# Patient Record
(demographics unavailable — no encounter records)

---

## 2024-11-26 NOTE — HISTORY OF PRESENT ILLNESS
[de-identified] : Carolina Whyte is a 75-year-old female who presents to the office for evaluation of her left knee pain.  Patient has been experiencing left knee pain for about 1 year.  Pain is located throughout the knee.  Pain is worse with walking.  She has tried Tylenol.  She has not tried physical therapy or injections.  No falls.  No fevers or chills.  History: Diabetes, HTN, HLD

## 2024-11-26 NOTE — PHYSICAL EXAM
[de-identified] : Constitutional:  75 year old female, alert and oriented, cooperative, in no acute distress.  HEENT  NC/AT.  Appearance: symmetric  Neck/Back Straight without deformity or instability.  Good ROM.  Chest/Respiratory  Respiratory effort: no intercostal retractions or use of accessory muscles. Nonlabored Breathing  Skin  On inspection, warm and dry without rashes or lesions.  Mental Status:  Judgment, insight: intact Orientation: oriented to time, place, and person  Neurological: Sensory and Motor are grossly intact throughout  Left Knee  Inspection:     Skin intact, no rashes or lesions     No Effusion     Non-tender to palpation over tibial tubercle, patella, medial and lateral joint line, and pes insertion.  Range of Motion: 	Extension - -10 degrees 	Flexion - 90 degrees 	Extensor lag: None  Stability:      Demonstrates no Varus or Valgus instability      Negative Anterior or Posterior drawer.      Negative Lachman's  Patella: stable, tracks well.   Neurologic Exam     Motor intact including 5/5 Extensor Hallucis Longus, 5/5 Flexor Hallucis Longus, 5/5 Tibialis Anterior and 5/5 Gastrocnemius     Sensation Intact to Light Touch including Saphenous, Sural, Superficial Peroneal, Deep Peroneal, Tibial nerve distributions  Vascular Exam     Foot is warm and well perfused with 2+ Dorsalis Pedis Pulse   No pain with range of motion of the bilateral hips or right knee. No lumbar paraspinal muscle tenderness. [de-identified] : XRay:  XRays of the Pelvis (1 View) taken in the office today and discussed with the patient. XRays demonstrate no obvious fracture or dislocation. There is no significant evidence of osteoarthritis or osteophyte formation. (my personal interpretation).  XRay: XRays of the Left Knee (4 Views) taken in the office today and reviewed with the patient. XRays demonstrate tricompartmental joint space narrowing, with bone on bone articulations in the medial compartment, with subchondral sclerosis, overlying osteophytes, all consistent with severe osteoarthritis, KL rdGrdrrdarddrderd:rd rd3rd. There is varus alignment. (my personal interpretation)

## 2024-11-26 NOTE — DISCUSSION/SUMMARY
[de-identified] : Carolina Whyte is a 75-year-old female who presents to the office for evaluation of her left knee pain.  X-rays showed severe left knee osteoarthritis.  Examination showed decreased left knee range of motion. Discussed with the patient the examination and imaging findings. Discussed with the patient the operative and nonoperative management of knee osteoarthritis, including total knee arthroplasty. Patient would like to continue with nonoperative management at this time. Discussed with the patient the nonoperative management of patient's knee osteoarthritis at this time, including physical therapy, anti-inflammatories, and injections. Patient was given a referral for physical therapy. Patient was given a prescription for Meloxicam 15mg daily for 30 days.  Patient will follow-up in 6 weeks for reevaluation and management.  Patient understanding and in agreement the plan.  All questions answered   Plan: -Physical Therapy  -Meloxicam 15mg daily for 30 days -Follow up in 6 weeks for reevaluation and management

## 2024-11-26 NOTE — HISTORY OF PRESENT ILLNESS
[de-identified] : Carolina Whyte is a 75-year-old female who presents to the office for evaluation of her left knee pain.  Patient has been experiencing left knee pain for about 1 year.  Pain is located throughout the knee.  Pain is worse with walking.  She has tried Tylenol.  She has not tried physical therapy or injections.  No falls.  No fevers or chills.  History: Diabetes, HTN, HLD

## 2024-11-26 NOTE — DISCUSSION/SUMMARY
[de-identified] : Carolina Whyte is a 75-year-old female who presents to the office for evaluation of her left knee pain.  X-rays showed severe left knee osteoarthritis.  Examination showed decreased left knee range of motion. Discussed with the patient the examination and imaging findings. Discussed with the patient the operative and nonoperative management of knee osteoarthritis, including total knee arthroplasty. Patient would like to continue with nonoperative management at this time. Discussed with the patient the nonoperative management of patient's knee osteoarthritis at this time, including physical therapy, anti-inflammatories, and injections. Patient was given a referral for physical therapy. Patient was given a prescription for Meloxicam 15mg daily for 30 days.  Patient will follow-up in 6 weeks for reevaluation and management.  Patient understanding and in agreement the plan.  All questions answered   Plan: -Physical Therapy  -Meloxicam 15mg daily for 30 days -Follow up in 6 weeks for reevaluation and management

## 2024-11-26 NOTE — PHYSICAL EXAM
[de-identified] : Constitutional:  75 year old female, alert and oriented, cooperative, in no acute distress.  HEENT  NC/AT.  Appearance: symmetric  Neck/Back Straight without deformity or instability.  Good ROM.  Chest/Respiratory  Respiratory effort: no intercostal retractions or use of accessory muscles. Nonlabored Breathing  Skin  On inspection, warm and dry without rashes or lesions.  Mental Status:  Judgment, insight: intact Orientation: oriented to time, place, and person  Neurological: Sensory and Motor are grossly intact throughout  Left Knee  Inspection:     Skin intact, no rashes or lesions     No Effusion     Non-tender to palpation over tibial tubercle, patella, medial and lateral joint line, and pes insertion.  Range of Motion: 	Extension - -10 degrees 	Flexion - 90 degrees 	Extensor lag: None  Stability:      Demonstrates no Varus or Valgus instability      Negative Anterior or Posterior drawer.      Negative Lachman's  Patella: stable, tracks well.   Neurologic Exam     Motor intact including 5/5 Extensor Hallucis Longus, 5/5 Flexor Hallucis Longus, 5/5 Tibialis Anterior and 5/5 Gastrocnemius     Sensation Intact to Light Touch including Saphenous, Sural, Superficial Peroneal, Deep Peroneal, Tibial nerve distributions  Vascular Exam     Foot is warm and well perfused with 2+ Dorsalis Pedis Pulse   No pain with range of motion of the bilateral hips or right knee. No lumbar paraspinal muscle tenderness. [de-identified] : XRay:  XRays of the Pelvis (1 View) taken in the office today and discussed with the patient. XRays demonstrate no obvious fracture or dislocation. There is no significant evidence of osteoarthritis or osteophyte formation. (my personal interpretation).  XRay: XRays of the Left Knee (4 Views) taken in the office today and reviewed with the patient. XRays demonstrate tricompartmental joint space narrowing, with bone on bone articulations in the medial compartment, with subchondral sclerosis, overlying osteophytes, all consistent with severe osteoarthritis, KL thGthrthathdtheth:th th5th. There is varus alignment. (my personal interpretation)

## 2024-12-17 NOTE — PHYSICAL EXAM
[Normal] : supple, no neck mass and thyroid not enlarged [Normal Neck Lymph Nodes] : normal neck lymph nodes  [Normal Supraclavicular Lymph Nodes] : normal supraclavicular lymph nodes [Normal Groin Lymph Nodes] : normal groin lymph nodes [Normal Axillary Lymph Nodes] : normal axillary lymph nodes [Normal] : oriented to person, place and time, with appropriate affect [de-identified] : trochar sites well healed

## 2024-12-17 NOTE — CONSULT LETTER
[Dear  ___] : Dear  [unfilled], [Consult Letter:] : I had the pleasure of evaluating your patient, [unfilled]. [Please see my note below.] : Please see my note below. [Consult Closing:] : Thank you very much for allowing me to participate in the care of this patient.  If you have any questions, please do not hesitate to contact me. [Sincerely,] : Sincerely, [DrBest  ___] : Dr. PAREDES [DrBest ___] : Dr. PAREDES [FreeTextEntry2] : Nicholas Lynch MD [FreeTextEntry3] : Matt Brown MD Surgical Oncology [facility] Office: [phone] Cell: [phone]

## 2024-12-17 NOTE — HISTORY OF PRESENT ILLNESS
[de-identified] : Ms. CAROLINA TRAN is a 75-year-old woman, referred by Dr. Nicholas Lynch for consultation regarding a pelvis mass, now s/p pelvic mass excision on 2024, here for a follow-up visit.   Carolina presented to [facility] in 2024 w/ complaints of dysarthria & paresthesia - c/f acute CVA (work up ended being negative). Incidentally found to have a pelvis mass that was not amenable to IR biopsy - advised to follow-up w/ GI.  CT A/P 2024 - in sigmoid mesentery & inseparable from the mesenteric side of the sigmoid colon is a mass measuring 3.7 x 2.9 cm - c/f neoplasm - there is an additional mass more posteriorly in the pelvis in a presacral location measuring 2.7 x 2.9 cm - c/f neoplasm - 5 mm nodule adjacent to the cecum  abd MRI 3/15/2024 ([facility]) - several scattered mesenteric LN, predominantly measuring sub centimeter in short axis - cannot evaluate pelvic masses seen on outside CT -> f/u w/ MRI pelvis or PET/CT   MR pelvis 5/10/2024 - in sigmoid and to a lesser extent, descending colon, there is diverticulosis w/o diverticulitis - in central pelvis, slightly left of midline, in sigmoid mesentery inseparable from wall of adjacent bowel & tethered to dome of bladder - there is an irregular spiculated masslike conglomerate, measuring 3.0 x 3.6 cm - in posterior pelvis, slightly left of midline, in presacral region, at level of S2-3, there is an irregular spiculated masslike conglomerate, measuring approx 2.6 x 2.6 cm - in perisigmoid & perirectal regions, there are several additional nodes, predominantly measuring sub centimeter in short axis  * these findings are indeterminate/suspicious, with differentials including but not limited to deep infiltrating endometriosis, carcinoid, desmoid, sclerosing mesenteritis, lymphoma and/or metastases -> clinical or lab follow-up, recommend PET/CT, biopsy and/or surgical consult  EGD/colonoscopy (Dr. Carlos Alberto Tabares) 2024 (prior to this her last scope was ~17 years prior) - normal esophagus w/o esophagitis - stomach antrum & body - positive for H. pylori - GE junction bx - squamocolumnar junction w/ chronic active carditis  - 1 cm hiatal hernia - erosive gastropathy w/ stigmata of recent bleeding, bx - normal duodenum  - 15 mm sessile polyp in the sigmoid colon, resected - tubulovillous adenoma *repeat scope in 1 year - normal mucosa in colon, bx - colonic mucosa w/ superficial hyperplastic changes  f/u H. pylori breath test 2024 - WNL (after receiving triple therapy)   PMH:  DM, gout, HLD. HTN, TIA (2016), vitamin D deficiency, H pylori (2024) PSH:  appendectomy >20 years ago, back surgery, B/L cataract surgery, JAMES-BSO >20 years ago (for heavy perimenopausal bleeding, no adjuvant HRT) Meds:  Janumet, ASA 81MG, amlodipine, atorvastatin, Olmesartan, Omeprazole ALL:  denies SH:  denies tobacco use, social ETOH, lives with her 2 daughters & grandchild, retired from a management job at [employer] FH: father w/ gastric cancer, sister w/ unknown primary GYN: Menarche 12. Menopause ~50's. . Age of first full-term pregnancy 22. Denies any OCP/HRT use ECOG 0  PET/CT 2024  - the 2 infiltrating masses in the pelvis are FDG avid, the anterior spiculated mass is adjacent to and inseparable from the sigmoid loop (2 x 2.9 cm, SUV 8.7), the posterior spiculated mass (2.7 x 2.7 cm, SUV 9.7) - small nodules adjacent to the masses are nonavid - more distally there is FDG uptake in the rectum & anal region w/o definite mass  - no enlarged of FDG avid LAD in the pelvis or abdomen  2024 - Carolina is here for an initial consultation, accompanied by her granddaughter, Alba. She reports that these findings are completely incidental, she is without any abdominal pain, bowel habits remain regular. Her appetite and weight are also well maintained. She is pending a Left knee total replacement but that is on hold given these current findings.  We discussed the need for excision of the pelvic masses.  We discussed the risks, benefits and alternatives of a robotic possible open approach, and a possible partial colectomy.  We also discussed post operative expectations and possible complications.  Carolina expresses understanding and agrees to proceed.  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 She is eating and voiding with no issues, ambulating well and pain is at a minimum.  Carolina is doing well; we discussed her final pathology which is negative for carcinoma but ultimately shows Rosai-Tomy disease. Discussing the findings with Pathology, they noted a significant number plasma cells and that IgG4 levels be checked.  She will be seeing Hematology as well.  Carolina will return in 3 months.  2024 - Carolina returns for ongoing follow-up, she is feeling well and back to her baseline. She notes some sensitivity to her incisions when wearing clothes but nothing serious that requires medication. She has not seen hematology yet.

## 2024-12-17 NOTE — HISTORY OF PRESENT ILLNESS
[de-identified] : Ms. CAROLINA TRAN is a 75-year-old woman, referred by Dr. Nicholas Lynch for consultation regarding a pelvis mass, now s/p pelvic mass excision on 2024, here for a follow-up visit.   Carolina presented to [facility] in 2024 w/ complaints of dysarthria & paresthesia - c/f acute CVA (work up ended being negative). Incidentally found to have a pelvis mass that was not amenable to IR biopsy - advised to follow-up w/ GI.  CT A/P 2024 - in sigmoid mesentery & inseparable from the mesenteric side of the sigmoid colon is a mass measuring 3.7 x 2.9 cm - c/f neoplasm - there is an additional mass more posteriorly in the pelvis in a presacral location measuring 2.7 x 2.9 cm - c/f neoplasm - 5 mm nodule adjacent to the cecum  abd MRI 3/15/2024 ([facility]) - several scattered mesenteric LN, predominantly measuring sub centimeter in short axis - cannot evaluate pelvic masses seen on outside CT -> f/u w/ MRI pelvis or PET/CT   MR pelvis 5/10/2024 - in sigmoid and to a lesser extent, descending colon, there is diverticulosis w/o diverticulitis - in central pelvis, slightly left of midline, in sigmoid mesentery inseparable from wall of adjacent bowel & tethered to dome of bladder - there is an irregular spiculated masslike conglomerate, measuring 3.0 x 3.6 cm - in posterior pelvis, slightly left of midline, in presacral region, at level of S2-3, there is an irregular spiculated masslike conglomerate, measuring approx 2.6 x 2.6 cm - in perisigmoid & perirectal regions, there are several additional nodes, predominantly measuring sub centimeter in short axis  * these findings are indeterminate/suspicious, with differentials including but not limited to deep infiltrating endometriosis, carcinoid, desmoid, sclerosing mesenteritis, lymphoma and/or metastases -> clinical or lab follow-up, recommend PET/CT, biopsy and/or surgical consult  EGD/colonoscopy (Dr. Carlos Alberto Tabares) 2024 (prior to this her last scope was ~17 years prior) - normal esophagus w/o esophagitis - stomach antrum & body - positive for H. pylori - GE junction bx - squamocolumnar junction w/ chronic active carditis  - 1 cm hiatal hernia - erosive gastropathy w/ stigmata of recent bleeding, bx - normal duodenum  - 15 mm sessile polyp in the sigmoid colon, resected - tubulovillous adenoma *repeat scope in 1 year - normal mucosa in colon, bx - colonic mucosa w/ superficial hyperplastic changes  f/u H. pylori breath test 2024 - WNL (after receiving triple therapy)   PMH:  DM, gout, HLD. HTN, TIA (2016), vitamin D deficiency, H pylori (2024) PSH:  appendectomy >20 years ago, back surgery, B/L cataract surgery, JAMES-BSO >20 years ago (for heavy perimenopausal bleeding, no adjuvant HRT) Meds:  Janumet, ASA 81MG, amlodipine, atorvastatin, Olmesartan, Omeprazole ALL:  denies SH:  denies tobacco use, social ETOH, lives with her 2 daughters & grandchild, retired from a management job at [employer] FH: father w/ gastric cancer, sister w/ unknown primary GYN: Menarche 12. Menopause ~50's. . Age of first full-term pregnancy 22. Denies any OCP/HRT use ECOG 0  PET/CT 2024  - the 2 infiltrating masses in the pelvis are FDG avid, the anterior spiculated mass is adjacent to and inseparable from the sigmoid loop (2 x 2.9 cm, SUV 8.7), the posterior spiculated mass (2.7 x 2.7 cm, SUV 9.7) - small nodules adjacent to the masses are nonavid - more distally there is FDG uptake in the rectum & anal region w/o definite mass  - no enlarged of FDG avid LAD in the pelvis or abdomen  2024 - Carolina is here for an initial consultation, accompanied by her granddaughter, Alba. She reports that these findings are completely incidental, she is without any abdominal pain, bowel habits remain regular. Her appetite and weight are also well maintained. She is pending a Left knee total replacement but that is on hold given these current findings.  We discussed the need for excision of the pelvic masses.  We discussed the risks, benefits and alternatives of a robotic possible open approach, and a possible partial colectomy.  We also discussed post operative expectations and possible complications.  Carolina expresses understanding and agrees to proceed.  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 She is eating and voiding with no issues, ambulating well and pain is at a minimum.  Carolina is doing well; we discussed her final pathology which is negative for carcinoma but ultimately shows Rosai-Tomy disease. Discussing the findings with Pathology, they noted a significant number plasma cells and that IgG4 levels be checked.  She will be seeing Hematology as well.  Carolina will return in 3 months.  2024 - Carolina returns for ongoing follow-up, she is feeling well and back to her baseline. She notes some sensitivity to her incisions when wearing clothes but nothing serious that requires medication. She has not seen hematology yet.

## 2024-12-17 NOTE — CONSULT LETTER
[Dear  ___] : Dear  [unfilled], [Consult Letter:] : I had the pleasure of evaluating your patient, [unfilled]. [Please see my note below.] : Please see my note below. [Consult Closing:] : Thank you very much for allowing me to participate in the care of this patient.  If you have any questions, please do not hesitate to contact me. [Sincerely,] : Sincerely, [DrBest  ___] : Dr. PAREDES [DrBest ___] : Dr. PAREDES [FreeTextEntry2] : Nicholas Lynch MD [FreeTextEntry3] : Matt Brown MD Surgical Oncology [facility] Office: [phone] Cell: [phone]

## 2024-12-17 NOTE — PHYSICAL EXAM
[Normal] : supple, no neck mass and thyroid not enlarged [Normal Neck Lymph Nodes] : normal neck lymph nodes  [Normal Supraclavicular Lymph Nodes] : normal supraclavicular lymph nodes [Normal Groin Lymph Nodes] : normal groin lymph nodes [Normal Axillary Lymph Nodes] : normal axillary lymph nodes [Normal] : oriented to person, place and time, with appropriate affect [de-identified] : trochar sites well healed

## 2024-12-17 NOTE — ASSESSMENT
[FreeTextEntry1] : We again discussed the need for Medical Oncology evaluation.  Carolina has the contact information and will reach out.  She will follow up in 4 months.  All medical entries were at my, Dr. Matt Brown, direction. I have reviewed the chart and agree that the record accurately reflects my personal performance of the history, physical exam, assessment, and plan.  Our office nurse practitioner was present for the duration of the office visit.

## 2025-01-07 NOTE — DISCUSSION/SUMMARY
[de-identified] : Carolina Whyte is a 75-year-old female who presents to the office for follow-up of her left knee pain.  X-rays showed severe left knee osteoarthritis.  Examination showed decreased left knee range of motion. Discussed with patient the examination and imaging findings.  Discussed with patient the operative and nonoperative management of knee osteoarthritis, including total knee arthroplasty.  Discussed the nonoperative management of knee osteoarthritis, including physical therapy, anti-inflammatories, and injections.  Patient has tried nonoperative management, but continues to have knee pain.  Discussed total knee arthroplasty at length, including surgical procedure, hospital course, DVT prophylaxis, antibiotics, physical therapy, recovery, and risks. Patient would like to proceed with total knee arthroplasty.  Discussed that patient will require medical clearance prior to surgery.  Discussed obtaining a CT scan prior to surgery.  Patient understanding and in agreement with the plan.  All questions answered.   Discussed the imaging and physical exam findings with the patient consistent with endstage knee degenerative disease. The patient has failed conservative management including physical therapy and pain control. The risks, benefits and alternatives to total knee replacement were discussed with the patient in detail and the patient elected to proceed with surgery. Discussed the surgical plan with the patient including implant options and surgical approach.   Surgical risks including fracture requiring fixation, instability or dislocation, temporary or permanent leg length inequality, infection, bleeding, stiffness, failure to alleviate pain, failure to achieve desired results, need for further surgery, scar tissue formation, hardware failure, chronic pain, injury to nerves resulting in extremity dysfunction, injury to arteries and veins, deep vein thrombosis or pulmonary embolism requiring anticoagulation and medical risk factors including heart attack, stroke, death, neurological injury, pneumonia, kidney or other organ failure were discussed with the patient.   Patient was understanding and in agreement with the treatment plan. All questions answered.  Plan: -Medical Clearance -CT Scan Left Lower Extremity -Follow up in 2 weeks for reevaluation and management -Left Total Knee Arthroplasty  Surgical Plan: Diagnosis: Left Knee Osteoarthritis Laterality: Left Operative procedure: Left Total Knee Arthroplasty Location: [facility]  DVT prophylaxis: Aspirin 81mg BID TXA: IV Plan for discharge: Home  Pre- & Post Operative Antibiotics: Cefazolin 2g  Clearances:      Medical: Pending  Comorbidities:      Metal Allergy: Negative      Chronic Pain: Negative      Diabetes: Positive, Last A1C: Pending      Use of Anticoagulation: Aspirin 81mg once per day      Atrial Fibrillation: Negative      History of VTE: Negative      History of Cardiac Stents: Negative      Skin Infections/Open Wounds: Negative      MRSA Infection/Colonization: Negative      Current Urinary Symptoms: Negative      Immunocompromise: Negative      Inflammatory Arthritis: Negative      Smoking: Negative      Drug Use: Negative      Alcohol Use: Occasional      Obstructive Sleep Apnea: Negative      Neurologic Disease: Negative

## 2025-01-07 NOTE — HISTORY OF PRESENT ILLNESS
[de-identified] : 1/7/2025  Carolina Whyte is a 75-year-old female who presented to the office for follow-up of her left knee pain.  Patient continues to have left knee pain.  She can still have significant pain.  It did somewhat improved with physical therapy.  The meloxicam did not help.  Pain is worse with sitting to standing.  Pain is located throughout the knee.  She has tried NSAIDs.  No falls.  No fevers or chills.  11/26/2024 Carolina Whyte is a 75-year-old female who presents to the office for evaluation of her left knee pain.  Patient has been experiencing left knee pain for about 1 year.  Pain is located throughout the knee.  Pain is worse with walking.  She has tried Tylenol.  She has not tried physical therapy or injections.  No falls.  No fevers or chills.  History: Diabetes, HTN, HLD

## 2025-01-07 NOTE — DISCUSSION/SUMMARY
[de-identified] : Carolina Whyte is a 75-year-old female who presents to the office for follow-up of her left knee pain.  X-rays showed severe left knee osteoarthritis.  Examination showed decreased left knee range of motion. Discussed with patient the examination and imaging findings.  Discussed with patient the operative and nonoperative management of knee osteoarthritis, including total knee arthroplasty.  Discussed the nonoperative management of knee osteoarthritis, including physical therapy, anti-inflammatories, and injections.  Patient has tried nonoperative management, but continues to have knee pain.  Discussed total knee arthroplasty at length, including surgical procedure, hospital course, DVT prophylaxis, antibiotics, physical therapy, recovery, and risks. Patient would like to proceed with total knee arthroplasty.  Discussed that patient will require medical clearance prior to surgery.  Discussed obtaining a CT scan prior to surgery.  Patient understanding and in agreement with the plan.  All questions answered.   Discussed the imaging and physical exam findings with the patient consistent with endstage knee degenerative disease. The patient has failed conservative management including physical therapy and pain control. The risks, benefits and alternatives to total knee replacement were discussed with the patient in detail and the patient elected to proceed with surgery. Discussed the surgical plan with the patient including implant options and surgical approach.   Surgical risks including fracture requiring fixation, instability or dislocation, temporary or permanent leg length inequality, infection, bleeding, stiffness, failure to alleviate pain, failure to achieve desired results, need for further surgery, scar tissue formation, hardware failure, chronic pain, injury to nerves resulting in extremity dysfunction, injury to arteries and veins, deep vein thrombosis or pulmonary embolism requiring anticoagulation and medical risk factors including heart attack, stroke, death, neurological injury, pneumonia, kidney or other organ failure were discussed with the patient.   Patient was understanding and in agreement with the treatment plan. All questions answered.  Plan: -Medical Clearance -CT Scan Left Lower Extremity -Follow up in 2 weeks for reevaluation and management -Left Total Knee Arthroplasty  Surgical Plan: Diagnosis: Left Knee Osteoarthritis Laterality: Left Operative procedure: Left Total Knee Arthroplasty Location: [facility]  DVT prophylaxis: Aspirin 81mg BID TXA: IV Plan for discharge: Home  Pre- & Post Operative Antibiotics: Cefazolin 2g  Clearances:      Medical: Pending  Comorbidities:      Metal Allergy: Negative      Chronic Pain: Negative      Diabetes: Positive, Last A1C: Pending      Use of Anticoagulation: Aspirin 81mg once per day      Atrial Fibrillation: Negative      History of VTE: Negative      History of Cardiac Stents: Negative      Skin Infections/Open Wounds: Negative      MRSA Infection/Colonization: Negative      Current Urinary Symptoms: Negative      Immunocompromise: Negative      Inflammatory Arthritis: Negative      Smoking: Negative      Drug Use: Negative      Alcohol Use: Occasional      Obstructive Sleep Apnea: Negative      Neurologic Disease: Negative

## 2025-01-07 NOTE — PHYSICAL EXAM
[de-identified] : Constitutional:  75 year old female, alert and oriented, cooperative, in no acute distress.  HEENT  NC/AT.  Appearance: symmetric  Neck/Back Straight without deformity or instability.  Good ROM.  Chest/Respiratory  Respiratory effort: no intercostal retractions or use of accessory muscles. Nonlabored Breathing  Skin  On inspection, warm and dry without rashes or lesions.  Mental Status:  Judgment, insight: intact Orientation: oriented to time, place, and person  Neurological: Sensory and Motor are grossly intact throughout  Left Knee  Inspection:     Skin intact, no rashes or lesions     No Effusion     Non-tender to palpation over tibial tubercle, patella, medial and lateral joint line, and pes insertion.  Range of Motion: 	Extension - -10 degrees 	Flexion - 95 degrees 	Extensor lag: None  Stability:      Demonstrates no Varus or Valgus instability      Negative Anterior or Posterior drawer.      Negative Lachman's  Patella: stable, tracks well.   Neurologic Exam     Motor intact including 5/5 Extensor Hallucis Longus, 5/5 Flexor Hallucis Longus, 5/5 Tibialis Anterior and 5/5 Gastrocnemius     Sensation Intact to Light Touch including Saphenous, Sural, Superficial Peroneal, Deep Peroneal, Tibial nerve distributions  Vascular Exam     Foot is warm and well perfused with 2+ Dorsalis Pedis Pulse   No pain with range of motion of the bilateral hips or right knee. No lumbar paraspinal muscle tenderness. [de-identified] : XRay:  XRays of the Pelvis (1 View) taken in the office today and discussed with the patient. XRays demonstrate no obvious fracture or dislocation. There is no significant evidence of osteoarthritis or osteophyte formation. (my personal interpretation).  XRay: XRays of the Left Knee (4 Views) taken in the office today and reviewed with the patient. XRays demonstrate tricompartmental joint space narrowing, with bone on bone articulations in the medial compartment, with subchondral sclerosis, overlying osteophytes, all consistent with severe osteoarthritis, KL thGthrthathdtheth:th th5th. There is varus alignment. (my personal interpretation)

## 2025-01-07 NOTE — PHYSICAL EXAM
[de-identified] : Constitutional:  75 year old female, alert and oriented, cooperative, in no acute distress.  HEENT  NC/AT.  Appearance: symmetric  Neck/Back Straight without deformity or instability.  Good ROM.  Chest/Respiratory  Respiratory effort: no intercostal retractions or use of accessory muscles. Nonlabored Breathing  Skin  On inspection, warm and dry without rashes or lesions.  Mental Status:  Judgment, insight: intact Orientation: oriented to time, place, and person  Neurological: Sensory and Motor are grossly intact throughout  Left Knee  Inspection:     Skin intact, no rashes or lesions     No Effusion     Non-tender to palpation over tibial tubercle, patella, medial and lateral joint line, and pes insertion.  Range of Motion: 	Extension - -10 degrees 	Flexion - 95 degrees 	Extensor lag: None  Stability:      Demonstrates no Varus or Valgus instability      Negative Anterior or Posterior drawer.      Negative Lachman's  Patella: stable, tracks well.   Neurologic Exam     Motor intact including 5/5 Extensor Hallucis Longus, 5/5 Flexor Hallucis Longus, 5/5 Tibialis Anterior and 5/5 Gastrocnemius     Sensation Intact to Light Touch including Saphenous, Sural, Superficial Peroneal, Deep Peroneal, Tibial nerve distributions  Vascular Exam     Foot is warm and well perfused with 2+ Dorsalis Pedis Pulse   No pain with range of motion of the bilateral hips or right knee. No lumbar paraspinal muscle tenderness. [de-identified] : XRay:  XRays of the Pelvis (1 View) taken in the office today and discussed with the patient. XRays demonstrate no obvious fracture or dislocation. There is no significant evidence of osteoarthritis or osteophyte formation. (my personal interpretation).  XRay: XRays of the Left Knee (4 Views) taken in the office today and reviewed with the patient. XRays demonstrate tricompartmental joint space narrowing, with bone on bone articulations in the medial compartment, with subchondral sclerosis, overlying osteophytes, all consistent with severe osteoarthritis, KL rdGrdrrdarddrderd:rd rd3rd. There is varus alignment. (my personal interpretation)

## 2025-01-07 NOTE — HISTORY OF PRESENT ILLNESS
[de-identified] : 1/7/2025  Carolina Whyte is a 75-year-old female who presented to the office for follow-up of her left knee pain.  Patient continues to have left knee pain.  She can still have significant pain.  It did somewhat improved with physical therapy.  The meloxicam did not help.  Pain is worse with sitting to standing.  Pain is located throughout the knee.  She has tried NSAIDs.  No falls.  No fevers or chills.  11/26/2024 Carolina Whyte is a 75-year-old female who presents to the office for evaluation of her left knee pain.  Patient has been experiencing left knee pain for about 1 year.  Pain is located throughout the knee.  Pain is worse with walking.  She has tried Tylenol.  She has not tried physical therapy or injections.  No falls.  No fevers or chills.  History: Diabetes, HTN, HLD

## 2025-01-23 NOTE — DISCUSSION/SUMMARY
[de-identified] : Carolina Whyte is a 75-year-old female who presents to the office for follow-up of her left knee pain.  X-rays showed severe left knee osteoarthritis.  Examination showed decreased left knee range of motion. Discussed with patient the examination and imaging findings.  Discussed with patient the operative and nonoperative management of knee osteoarthritis, including total knee arthroplasty.  Discussed the nonoperative management of knee osteoarthritis, including physical therapy, anti-inflammatories, and injections.  Patient has tried nonoperative management, but continues to have knee pain.  Discussed total knee arthroplasty at length, including surgical procedure, hospital course, DVT prophylaxis, antibiotics, physical therapy, recovery, and risks. Patient would like to proceed with total knee arthroplasty.  Discussed that patient will require medical clearance prior to surgery.  Discussed obtaining a CT scan prior to surgery.  Patient understanding and in agreement with the plan.  All questions answered.   Discussed the imaging and physical exam findings with the patient consistent with endstage knee degenerative disease. The patient has failed conservative management including physical therapy and pain control. The risks, benefits and alternatives to total knee replacement were discussed with the patient in detail and the patient elected to proceed with surgery. Discussed the surgical plan with the patient including implant options and surgical approach.   Surgical risks including fracture requiring fixation, instability or dislocation, temporary or permanent leg length inequality, infection, bleeding, stiffness, failure to alleviate pain, failure to achieve desired results, need for further surgery, scar tissue formation, hardware failure, chronic pain, injury to nerves resulting in extremity dysfunction, injury to arteries and veins, deep vein thrombosis or pulmonary embolism requiring anticoagulation and medical risk factors including heart attack, stroke, death, neurological injury, pneumonia, kidney or other organ failure were discussed with the patient.   Patient was understanding and in agreement with the treatment plan. All questions answered.  Plan: -Medical Clearance -CT Scan Left Lower Extremity -Left Total Knee Arthroplasty  Surgical Plan: Diagnosis: Left Knee Osteoarthritis Laterality: Left Operative procedure: Left Total Knee Arthroplasty Location: [facility]  DVT prophylaxis: Aspirin 81mg BID TXA: IV Plan for discharge: Home  Pre- & Post Operative Antibiotics: Cefazolin 2g  Clearances:      Medical: Pending  Comorbidities:      Metal Allergy: Negative      Chronic Pain: Negative      Diabetes: Positive, Last A1C: Pending      Use of Anticoagulation: Aspirin 81mg once per day      Atrial Fibrillation: Negative      History of VTE: Negative      History of Cardiac Stents: Negative      Skin Infections/Open Wounds: Negative      MRSA Infection/Colonization: Negative      Current Urinary Symptoms: Negative      Immunocompromise: Negative      Inflammatory Arthritis: Negative      Smoking: Negative      Drug Use: Negative      Alcohol Use: Occasional      Obstructive Sleep Apnea: Negative      Neurologic Disease: Negative

## 2025-01-23 NOTE — PHYSICAL EXAM
[de-identified] : Constitutional:  75 year old female, alert and oriented, cooperative, in no acute distress.  HEENT  NC/AT.  Appearance: symmetric  Neck/Back Straight without deformity or instability.  Good ROM.  Chest/Respiratory  Respiratory effort: no intercostal retractions or use of accessory muscles. Nonlabored Breathing  Skin  On inspection, warm and dry without rashes or lesions.  Mental Status:  Judgment, insight: intact Orientation: oriented to time, place, and person  Neurological: Sensory and Motor are grossly intact throughout  Left Knee  Inspection:     Skin intact, no rashes or lesions     No Effusion     Non-tender to palpation over tibial tubercle, patella, medial and lateral joint line, and pes insertion.  Range of Motion: 	Extension - -10 degrees 	Flexion - 95 degrees 	Extensor lag: None  Stability:      Demonstrates no Varus or Valgus instability      Negative Anterior or Posterior drawer.      Negative Lachman's  Patella: stable, tracks well.   Neurologic Exam     Motor intact including 5/5 Extensor Hallucis Longus, 5/5 Flexor Hallucis Longus, 5/5 Tibialis Anterior and 5/5 Gastrocnemius     Sensation Intact to Light Touch including Saphenous, Sural, Superficial Peroneal, Deep Peroneal, Tibial nerve distributions  Vascular Exam     Foot is warm and well perfused with 2+ Dorsalis Pedis Pulse   No pain with range of motion of the bilateral hips or right knee. No lumbar paraspinal muscle tenderness. [de-identified] : XRay:  XRays of the Pelvis (1 View) taken on 11/26/2024. XRays demonstrate no obvious fracture or dislocation. There is no significant evidence of osteoarthritis or osteophyte formation. (my personal interpretation).  XRay: XRays of the Left Knee (4 Views) taken on 11/26/2024. XRays demonstrate tricompartmental joint space narrowing, with bone on bone articulations in the medial compartment, with subchondral sclerosis, overlying osteophytes, all consistent with severe osteoarthritis, KL thGthrthathdtheth:th th5th. There is varus alignment. (my personal interpretation)

## 2025-01-23 NOTE — PHYSICAL EXAM
[de-identified] : Constitutional:  75 year old female, alert and oriented, cooperative, in no acute distress.  HEENT  NC/AT.  Appearance: symmetric  Neck/Back Straight without deformity or instability.  Good ROM.  Chest/Respiratory  Respiratory effort: no intercostal retractions or use of accessory muscles. Nonlabored Breathing  Skin  On inspection, warm and dry without rashes or lesions.  Mental Status:  Judgment, insight: intact Orientation: oriented to time, place, and person  Neurological: Sensory and Motor are grossly intact throughout  Left Knee  Inspection:     Skin intact, no rashes or lesions     No Effusion     Non-tender to palpation over tibial tubercle, patella, medial and lateral joint line, and pes insertion.  Range of Motion: 	Extension - -10 degrees 	Flexion - 95 degrees 	Extensor lag: None  Stability:      Demonstrates no Varus or Valgus instability      Negative Anterior or Posterior drawer.      Negative Lachman's  Patella: stable, tracks well.   Neurologic Exam     Motor intact including 5/5 Extensor Hallucis Longus, 5/5 Flexor Hallucis Longus, 5/5 Tibialis Anterior and 5/5 Gastrocnemius     Sensation Intact to Light Touch including Saphenous, Sural, Superficial Peroneal, Deep Peroneal, Tibial nerve distributions  Vascular Exam     Foot is warm and well perfused with 2+ Dorsalis Pedis Pulse   No pain with range of motion of the bilateral hips or right knee. No lumbar paraspinal muscle tenderness. [de-identified] : XRay:  XRays of the Pelvis (1 View) taken on 11/26/2024. XRays demonstrate no obvious fracture or dislocation. There is no significant evidence of osteoarthritis or osteophyte formation. (my personal interpretation).  XRay: XRays of the Left Knee (4 Views) taken on 11/26/2024. XRays demonstrate tricompartmental joint space narrowing, with bone on bone articulations in the medial compartment, with subchondral sclerosis, overlying osteophytes, all consistent with severe osteoarthritis, KL rdGrdrrdarddrderd:rd rd3rd. There is varus alignment. (my personal interpretation)

## 2025-01-23 NOTE — DISCUSSION/SUMMARY
[de-identified] : Carolina Whyte is a 75-year-old female who presents to the office for follow-up of her left knee pain.  X-rays showed severe left knee osteoarthritis.  Examination showed decreased left knee range of motion. Discussed with patient the examination and imaging findings.  Discussed with patient the operative and nonoperative management of knee osteoarthritis, including total knee arthroplasty.  Discussed the nonoperative management of knee osteoarthritis, including physical therapy, anti-inflammatories, and injections.  Patient has tried nonoperative management, but continues to have knee pain.  Discussed total knee arthroplasty at length, including surgical procedure, hospital course, DVT prophylaxis, antibiotics, physical therapy, recovery, and risks. Patient would like to proceed with total knee arthroplasty.  Discussed that patient will require medical clearance prior to surgery.  Discussed obtaining a CT scan prior to surgery.  Patient understanding and in agreement with the plan.  All questions answered.   Discussed the imaging and physical exam findings with the patient consistent with endstage knee degenerative disease. The patient has failed conservative management including physical therapy and pain control. The risks, benefits and alternatives to total knee replacement were discussed with the patient in detail and the patient elected to proceed with surgery. Discussed the surgical plan with the patient including implant options and surgical approach.   Surgical risks including fracture requiring fixation, instability or dislocation, temporary or permanent leg length inequality, infection, bleeding, stiffness, failure to alleviate pain, failure to achieve desired results, need for further surgery, scar tissue formation, hardware failure, chronic pain, injury to nerves resulting in extremity dysfunction, injury to arteries and veins, deep vein thrombosis or pulmonary embolism requiring anticoagulation and medical risk factors including heart attack, stroke, death, neurological injury, pneumonia, kidney or other organ failure were discussed with the patient.   Patient was understanding and in agreement with the treatment plan. All questions answered.  Plan: -Medical Clearance -CT Scan Left Lower Extremity -Left Total Knee Arthroplasty  Surgical Plan: Diagnosis: Left Knee Osteoarthritis Laterality: Left Operative procedure: Left Total Knee Arthroplasty Location: [facility]  DVT prophylaxis: Aspirin 81mg BID TXA: IV Plan for discharge: Home  Pre- & Post Operative Antibiotics: Cefazolin 2g  Clearances:      Medical: Pending  Comorbidities:      Metal Allergy: Negative      Chronic Pain: Negative      Diabetes: Positive, Last A1C: Pending      Use of Anticoagulation: Aspirin 81mg once per day      Atrial Fibrillation: Negative      History of VTE: Negative      History of Cardiac Stents: Negative      Skin Infections/Open Wounds: Negative      MRSA Infection/Colonization: Negative      Current Urinary Symptoms: Negative      Immunocompromise: Negative      Inflammatory Arthritis: Negative      Smoking: Negative      Drug Use: Negative      Alcohol Use: Occasional      Obstructive Sleep Apnea: Negative      Neurologic Disease: Negative

## 2025-01-23 NOTE — HISTORY OF PRESENT ILLNESS
[de-identified] : 1/23/2025  Carolina Whyte is a 75-year-old female who presents to the office for follow-up of her left knee pain.  Patient continues to have significant left knee pain.  She has been cleared for surgery.  1/7/2025  Carolina Whyte is a 75-year-old female who presented to the office for follow-up of her left knee pain.  Patient continues to have left knee pain.  She can still have significant pain.  It did somewhat improved with physical therapy.  The meloxicam did not help.  Pain is worse with sitting to standing.  Pain is located throughout the knee.  She has tried NSAIDs.  No falls.  No fevers or chills.  11/26/2024 Carolina Whyte is a 75-year-old female who presents to the office for evaluation of her left knee pain.  Patient has been experiencing left knee pain for about 1 year.  Pain is located throughout the knee.  Pain is worse with walking.  She has tried Tylenol.  She has not tried physical therapy or injections.  No falls.  No fevers or chills.  History: Diabetes, HTN, HLD

## 2025-01-23 NOTE — HISTORY OF PRESENT ILLNESS
[de-identified] : 1/23/2025  Carolina Whyte is a 75-year-old female who presents to the office for follow-up of her left knee pain.  Patient continues to have significant left knee pain.  She has been cleared for surgery.  1/7/2025  Carolina Whyte is a 75-year-old female who presented to the office for follow-up of her left knee pain.  Patient continues to have left knee pain.  She can still have significant pain.  It did somewhat improved with physical therapy.  The meloxicam did not help.  Pain is worse with sitting to standing.  Pain is located throughout the knee.  She has tried NSAIDs.  No falls.  No fevers or chills.  11/26/2024 Carolina Whyte is a 75-year-old female who presents to the office for evaluation of her left knee pain.  Patient has been experiencing left knee pain for about 1 year.  Pain is located throughout the knee.  Pain is worse with walking.  She has tried Tylenol.  She has not tried physical therapy or injections.  No falls.  No fevers or chills.  History: Diabetes, HTN, HLD

## 2025-02-07 NOTE — DISCUSSION/SUMMARY
[de-identified] : Carolina Whyte is a 75-year-old female who presents to the office for follow-up of her left TKA. XRays showed left total knee arthroplasty in good position and alignment. Examination showed range of motion -5 to 90. Discussed with the patient the examination and imaging findings. Discussed the management of total knee arthroplasty at this time, including physical therapy and DVT prophylaxis. Patient was given a referral to physical therapy. Patient will continued to take Aspirin twice daily for a total of 6 weeks for DVT prophylaxis. Dressing was removed. Discussed that patient may shower, but should not soak the wound. Patient will follow up in 4 weeks for reevaluation and management. Patient understanding and in agreement with the plan. All questions answered.   Plan: -Physical Therapy -DVT Prophylaxis: Aspirin twice daily for a total of 6 weeks -Patient may shower, but should not soak the wound -Follow up in 4 weeks for reevaluation and management

## 2025-02-07 NOTE — PHYSICAL EXAM
[de-identified] : Constitutional:  75 year old female, alert and oriented, cooperative, in no acute distress.  HEENT  NC/AT.  Appearance: symmetric  Neck/Back Straight without deformity or instability.  Good ROM.  Chest/Respiratory  Respiratory effort: no intercostal retractions or use of accessory muscles. Nonlabored Breathing  Skin  On inspection, warm and dry without rashes or lesions.  Mental Status:  Judgment, insight: intact Orientation: oriented to time, place, and person  Neurological: Sensory and Motor are grossly intact throughout  Left Knee  Inspection:     Incision well healed. No erythema or drainage     No Effusion     Non-tender to palpation over tibial tubercle, patella, medial and lateral joint line, and pes insertion.  Range of Motion: 	Extension - -5 degrees 	Flexion - 90 degrees 	Extensor lag: None  Stability:      Demonstrates no Varus or Valgus instability      Negative Anterior or Posterior drawer.      No mid flexion instability  Patella: stable, tracks well.   Neurologic Exam     Motor intact including 5/5 Extensor Hallucis Longus, 5/5 Flexor Hallucis Longus, 5/5 Tibialis Anterior and 5/5 Gastrocnemius     Sensation Intact to Light Touch including Saphenous, Sural, Superficial Peroneal, Deep Peroneal, Tibial nerve distributions  Vascular Exam     Foot is warm and well perfused with 2+ Dorsalis Pedis Pulse   No pain with range of motion of the bilateral hips or right knee. No lumbar paraspinal muscle tenderness. [de-identified] : XRay:  XRays of the Pelvis (1 View) and Left Hip (2 Views) taken in the office today and reviewed with the patient. XRays demonstrate a Left Total Hip Arthroplasty in good position and alignment. There is no obvious evidence of fracture, dislocation, osteolysis or loosening. (my personal interpretation) Components: [employer] [place] [employer] C[employer]ed      ADDENDUM: Corrected: XRays of Left Total knee arthroplasty not Left total hip arthroplasty  XRay:  XRays of the Left Knee (3 Views) taken in the office today and reviewed with the patient. XRays demonstrate a Left Total Knee Arthroplasty in good position and alignment. There is no obvious evidence of fracture, dislocation, osteolysis or loosening. (my personal interpretation) Components: [employer] C[employer]ed

## 2025-02-07 NOTE — DISCUSSION/SUMMARY
[de-identified] : Carolina Whyte is a 75-year-old female who presents to the office for follow-up of her left TKA. XRays showed left total knee arthroplasty in good position and alignment. Examination showed range of motion -5 to 90. Discussed with the patient the examination and imaging findings. Discussed the management of total knee arthroplasty at this time, including physical therapy and DVT prophylaxis. Patient was given a referral to physical therapy. Patient will continued to take Aspirin twice daily for a total of 6 weeks for DVT prophylaxis. Dressing was removed. Discussed that patient may shower, but should not soak the wound. Patient will follow up in 4 weeks for reevaluation and management. Patient understanding and in agreement with the plan. All questions answered.   Plan: -Physical Therapy -DVT Prophylaxis: Aspirin twice daily for a total of 6 weeks -Patient may shower, but should not soak the wound -Follow up in 4 weeks for reevaluation and management

## 2025-02-07 NOTE — HISTORY OF PRESENT ILLNESS
[de-identified] : 2/6/2025  Carolina Whyte presents to the office for follow-up of her left total knee arthroplasty.  Patient is currently doing well overall.  She is currently in home physical therapy.  No falls.  No fevers or chills.  1/23/2025  Carolina Whyte is a 75-year-old female who presents to the office for follow-up of her left knee pain.  Patient continues to have significant left knee pain.  She has been cleared for surgery.  1/7/2025  Carolina Whyte is a 75-year-old female who presented to the office for follow-up of her left knee pain.  Patient continues to have left knee pain.  She can still have significant pain.  It did somewhat improved with physical therapy.  The meloxicam did not help.  Pain is worse with sitting to standing.  Pain is located throughout the knee.  She has tried NSAIDs.  No falls.  No fevers or chills.  11/26/2024 Carolina Whyte is a 75-year-old female who presents to the office for evaluation of her left knee pain.  Patient has been experiencing left knee pain for about 1 year.  Pain is located throughout the knee.  Pain is worse with walking.  She has tried Tylenol.  She has not tried physical therapy or injections.  No falls.  No fevers or chills.  History: Diabetes, HTN, HLD

## 2025-02-07 NOTE — HISTORY OF PRESENT ILLNESS
[de-identified] : 2/6/2025  Carolina Whyte presents to the office for follow-up of her left total knee arthroplasty.  Patient is currently doing well overall.  She is currently in home physical therapy.  No falls.  No fevers or chills.  1/23/2025  Carolina Whyte is a 75-year-old female who presents to the office for follow-up of her left knee pain.  Patient continues to have significant left knee pain.  She has been cleared for surgery.  1/7/2025  Carolina Whyte is a 75-year-old female who presented to the office for follow-up of her left knee pain.  Patient continues to have left knee pain.  She can still have significant pain.  It did somewhat improved with physical therapy.  The meloxicam did not help.  Pain is worse with sitting to standing.  Pain is located throughout the knee.  She has tried NSAIDs.  No falls.  No fevers or chills.  11/26/2024 Carolina Whyte is a 75-year-old female who presents to the office for evaluation of her left knee pain.  Patient has been experiencing left knee pain for about 1 year.  Pain is located throughout the knee.  Pain is worse with walking.  She has tried Tylenol.  She has not tried physical therapy or injections.  No falls.  No fevers or chills.  History: Diabetes, HTN, HLD

## 2025-02-07 NOTE — HISTORY OF PRESENT ILLNESS
[de-identified] : 2/6/2025  Carolina Whyte presents to the office for follow-up of her left total knee arthroplasty.  Patient is currently doing well overall.  She is currently in home physical therapy.  No falls.  No fevers or chills.  1/23/2025  Carolina Whyte is a 75-year-old female who presents to the office for follow-up of her left knee pain.  Patient continues to have significant left knee pain.  She has been cleared for surgery.  1/7/2025  Carolina Whyte is a 75-year-old female who presented to the office for follow-up of her left knee pain.  Patient continues to have left knee pain.  She can still have significant pain.  It did somewhat improved with physical therapy.  The meloxicam did not help.  Pain is worse with sitting to standing.  Pain is located throughout the knee.  She has tried NSAIDs.  No falls.  No fevers or chills.  11/26/2024 Carolina Whyte is a 75-year-old female who presents to the office for evaluation of her left knee pain.  Patient has been experiencing left knee pain for about 1 year.  Pain is located throughout the knee.  Pain is worse with walking.  She has tried Tylenol.  She has not tried physical therapy or injections.  No falls.  No fevers or chills.  History: Diabetes, HTN, HLD

## 2025-02-07 NOTE — PHYSICAL EXAM
[de-identified] : Constitutional:  75 year old female, alert and oriented, cooperative, in no acute distress.  HEENT  NC/AT.  Appearance: symmetric  Neck/Back Straight without deformity or instability.  Good ROM.  Chest/Respiratory  Respiratory effort: no intercostal retractions or use of accessory muscles. Nonlabored Breathing  Skin  On inspection, warm and dry without rashes or lesions.  Mental Status:  Judgment, insight: intact Orientation: oriented to time, place, and person  Neurological: Sensory and Motor are grossly intact throughout  Left Knee  Inspection:     Incision well healed. No erythema or drainage     No Effusion     Non-tender to palpation over tibial tubercle, patella, medial and lateral joint line, and pes insertion.  Range of Motion: 	Extension - -5 degrees 	Flexion - 90 degrees 	Extensor lag: None  Stability:      Demonstrates no Varus or Valgus instability      Negative Anterior or Posterior drawer.      No mid flexion instability  Patella: stable, tracks well.   Neurologic Exam     Motor intact including 5/5 Extensor Hallucis Longus, 5/5 Flexor Hallucis Longus, 5/5 Tibialis Anterior and 5/5 Gastrocnemius     Sensation Intact to Light Touch including Saphenous, Sural, Superficial Peroneal, Deep Peroneal, Tibial nerve distributions  Vascular Exam     Foot is warm and well perfused with 2+ Dorsalis Pedis Pulse   No pain with range of motion of the bilateral hips or right knee. No lumbar paraspinal muscle tenderness. [de-identified] : XRay:  XRays of the Pelvis (1 View) and Left Hip (2 Views) taken in the office today and reviewed with the patient. XRays demonstrate a Left Total Hip Arthroplasty in good position and alignment. There is no obvious evidence of fracture, dislocation, osteolysis or loosening. (my personal interpretation) Components: [place] [employer] C[employer]ed      ADDENDUM: Corrected: XRays of Left Total knee arthroplasty not Left total hip arthroplasty  XRay:  XRays of the Left Knee (3 Views) taken in the office today and reviewed with the patient. XRays demonstrate a Left Total Knee Arthroplasty in good position and alignment. There is no obvious evidence of fracture, dislocation, osteolysis or loosening. (my personal interpretation) Components: [employer]

## 2025-02-07 NOTE — DISCUSSION/SUMMARY
[de-identified] : Carolina Whyte is a 75-year-old female who presents to the office for follow-up of her left TKA. XRays showed left total knee arthroplasty in good position and alignment. Examination showed range of motion -5 to 90. Discussed with the patient the examination and imaging findings. Discussed the management of total knee arthroplasty at this time, including physical therapy and DVT prophylaxis. Patient was given a referral to physical therapy. Patient will continued to take Aspirin twice daily for a total of 6 weeks for DVT prophylaxis. Dressing was removed. Discussed that patient may shower, but should not soak the wound. Patient will follow up in 4 weeks for reevaluation and management. Patient understanding and in agreement with the plan. All questions answered.   Plan: -Physical Therapy -DVT Prophylaxis: Aspirin twice daily for a total of 6 weeks -Patient may shower, but should not soak the wound -Follow up in 4 weeks for reevaluation and management

## 2025-02-07 NOTE — PHYSICAL EXAM
[de-identified] : Constitutional:  75 year old female, alert and oriented, cooperative, in no acute distress.  HEENT  NC/AT.  Appearance: symmetric  Neck/Back Straight without deformity or instability.  Good ROM.  Chest/Respiratory  Respiratory effort: no intercostal retractions or use of accessory muscles. Nonlabored Breathing  Skin  On inspection, warm and dry without rashes or lesions.  Mental Status:  Judgment, insight: intact Orientation: oriented to time, place, and person  Neurological: Sensory and Motor are grossly intact throughout  Left Knee  Inspection:     Incision well healed. No erythema or drainage     No Effusion     Non-tender to palpation over tibial tubercle, patella, medial and lateral joint line, and pes insertion.  Range of Motion: 	Extension - -5 degrees 	Flexion - 90 degrees 	Extensor lag: None  Stability:      Demonstrates no Varus or Valgus instability      Negative Anterior or Posterior drawer.      No mid flexion instability  Patella: stable, tracks well.   Neurologic Exam     Motor intact including 5/5 Extensor Hallucis Longus, 5/5 Flexor Hallucis Longus, 5/5 Tibialis Anterior and 5/5 Gastrocnemius     Sensation Intact to Light Touch including Saphenous, Sural, Superficial Peroneal, Deep Peroneal, Tibial nerve distributions  Vascular Exam     Foot is warm and well perfused with 2+ Dorsalis Pedis Pulse   No pain with range of motion of the bilateral hips or right knee. No lumbar paraspinal muscle tenderness. [de-identified] : XRay:  XRays of the Pelvis (1 View) and Left Hip (2 Views) taken in the office today and reviewed with the patient. XRays demonstrate a Left Total Hip Arthroplasty in good position and alignment. There is no obvious evidence of fracture, dislocation, osteolysis or loosening. (my personal interpretation) Components: [place] [employer] C[employer]ed      ADDENDUM: Corrected: XRays of Left Total knee arthroplasty not Left total hip arthroplasty  XRay:  XRays of the Left Knee (3 Views) taken in the office today and reviewed with the patient. XRays demonstrate a Left Total Knee Arthroplasty in good position and alignment. There is no obvious evidence of fracture, dislocation, osteolysis or loosening. (my personal interpretation) Components: [employer] C[employer]

## 2025-03-06 NOTE — HISTORY OF PRESENT ILLNESS
[de-identified] : 3/4/2025  Carolina Whyte presents to the office for follow-up of her left total knee arthroplasty.  Patient is doing well overall.  She is currently in physical therapy.  2/6/2025  Carolina Whyte presents to the office for follow-up of her left total knee arthroplasty.  Patient is currently doing well overall.  She is currently in home physical therapy.  No falls.  No fevers or chills.  1/23/2025  Carolina Whyte is a 75-year-old female who presents to the office for follow-up of her left knee pain.  Patient continues to have significant left knee pain.  She has been cleared for surgery.  1/7/2025  Carolina Whyte is a 75-year-old female who presented to the office for follow-up of her left knee pain.  Patient continues to have left knee pain.  She can still have significant pain.  It did somewhat improved with physical therapy.  The meloxicam did not help.  Pain is worse with sitting to standing.  Pain is located throughout the knee.  She has tried NSAIDs.  No falls.  No fevers or chills.  11/26/2024 Craolina Whyte is a 75-year-old female who presents to the office for evaluation of her left knee pain.  Patient has been experiencing left knee pain for about 1 year.  Pain is located throughout the knee.  Pain is worse with walking.  She has tried Tylenol.  She has not tried physical therapy or injections.  No falls.  No fevers or chills.  History: Diabetes, HTN, HLD

## 2025-03-06 NOTE — REVIEW OF SYSTEMS
[Joint Pain] : joint pain [Negative] : Endocrine [Joint Stiffness] : no joint stiffness [Joint Swelling] : no joint swelling [Fever] : no fever [Chills] : no chills No

## 2025-03-06 NOTE — PHYSICAL EXAM
[de-identified] : Constitutional:  75 year old female, alert and oriented, cooperative, in no acute distress.  HEENT  NC/AT.  Appearance: symmetric  Neck/Back Straight without deformity or instability.  Good ROM.  Chest/Respiratory  Respiratory effort: no intercostal retractions or use of accessory muscles. Nonlabored Breathing  Skin  On inspection, warm and dry without rashes or lesions.  Mental Status:  Judgment, insight: intact Orientation: oriented to time, place, and person  Neurological: Sensory and Motor are grossly intact throughout  Left Knee  Inspection:     Incision well healed. No erythema or drainage     No Effusion     Non-tender to palpation over tibial tubercle, patella, medial and lateral joint line, and pes insertion.  Range of Motion: 	Extension - -5 degrees 	Flexion - 90 degrees 	Extensor lag: None  Stability:      Demonstrates no Varus or Valgus instability      Negative Anterior or Posterior drawer.      No mid flexion instability  Patella: stable, tracks well.   Neurologic Exam     Motor intact including 5/5 Extensor Hallucis Longus, 5/5 Flexor Hallucis Longus, 5/5 Tibialis Anterior and 5/5 Gastrocnemius     Sensation Intact to Light Touch including Saphenous, Sural, Superficial Peroneal, Deep Peroneal, Tibial nerve distributions  Vascular Exam     Foot is warm and well perfused with 2+ Dorsalis Pedis Pulse   No pain with range of motion of the bilateral hips or right knee. No lumbar paraspinal muscle tenderness. [de-identified] : XRay:  XRays of the Left Knee (3 Views) taken on 2/6/2025. XRays demonstrate a Left Total Knee Arthroplasty in good position and alignment. There is no obvious evidence of fracture, dislocation, osteolysis or loosening. (my personal interpretation) Components: [place] [employer] C[employer]

## 2025-03-06 NOTE — HISTORY OF PRESENT ILLNESS
[de-identified] : 3/4/2025  Carolina Whyte presents to the office for follow-up of her left total knee arthroplasty.  Patient is doing well overall.  She is currently in physical therapy.  2/6/2025  Carolina Whyte presents to the office for follow-up of her left total knee arthroplasty.  Patient is currently doing well overall.  She is currently in home physical therapy.  No falls.  No fevers or chills.  1/23/2025  Carolina Whyte is a 75-year-old female who presents to the office for follow-up of her left knee pain.  Patient continues to have significant left knee pain.  She has been cleared for surgery.  1/7/2025  Carolina Whyte is a 75-year-old female who presented to the office for follow-up of her left knee pain.  Patient continues to have left knee pain.  She can still have significant pain.  It did somewhat improved with physical therapy.  The meloxicam did not help.  Pain is worse with sitting to standing.  Pain is located throughout the knee.  She has tried NSAIDs.  No falls.  No fevers or chills.  11/26/2024 Carolina Whyte is a 75-year-old female who presents to the office for evaluation of her left knee pain.  Patient has been experiencing left knee pain for about 1 year.  Pain is located throughout the knee.  Pain is worse with walking.  She has tried Tylenol.  She has not tried physical therapy or injections.  No falls.  No fevers or chills.  History: Diabetes, HTN, HLD

## 2025-03-06 NOTE — PHYSICAL EXAM
[de-identified] : Constitutional:  75 year old female, alert and oriented, cooperative, in no acute distress.  HEENT  NC/AT.  Appearance: symmetric  Neck/Back Straight without deformity or instability.  Good ROM.  Chest/Respiratory  Respiratory effort: no intercostal retractions or use of accessory muscles. Nonlabored Breathing  Skin  On inspection, warm and dry without rashes or lesions.  Mental Status:  Judgment, insight: intact Orientation: oriented to time, place, and person  Neurological: Sensory and Motor are grossly intact throughout  Left Knee  Inspection:     Incision well healed. No erythema or drainage     No Effusion     Non-tender to palpation over tibial tubercle, patella, medial and lateral joint line, and pes insertion.  Range of Motion: 	Extension - -5 degrees 	Flexion - 90 degrees 	Extensor lag: None  Stability:      Demonstrates no Varus or Valgus instability      Negative Anterior or Posterior drawer.      No mid flexion instability  Patella: stable, tracks well.   Neurologic Exam     Motor intact including 5/5 Extensor Hallucis Longus, 5/5 Flexor Hallucis Longus, 5/5 Tibialis Anterior and 5/5 Gastrocnemius     Sensation Intact to Light Touch including Saphenous, Sural, Superficial Peroneal, Deep Peroneal, Tibial nerve distributions  Vascular Exam     Foot is warm and well perfused with 2+ Dorsalis Pedis Pulse   No pain with range of motion of the bilateral hips or right knee. No lumbar paraspinal muscle tenderness. [de-identified] : XRay:  XRays of the Left Knee (3 Views) taken on 2/6/2025. XRays demonstrate a Left Total Knee Arthroplasty in good position and alignment. There is no obvious evidence of fracture, dislocation, osteolysis or loosening. (my personal interpretation) Components: [place] [employer]

## 2025-04-01 NOTE — HISTORY OF PRESENT ILLNESS
[de-identified] : 4/1/2025  Carolina Whyte presents to the office for follow-up of her left total knee arthroplasty.  Patient does not have significant knee pain.  She is currently in physical therapy.  She continues to have some decreased left knee range of motion.  3/4/2025  Carolina Whyte presents to the office for follow-up of her left total knee arthroplasty.  Patient is doing well overall.  She is currently in physical therapy.  2/6/2025  Carolina Whyte presents to the office for follow-up of her left total knee arthroplasty.  Patient is currently doing well overall.  She is currently in home physical therapy.  No falls.  No fevers or chills.  1/23/2025  Carolina Whyte is a 75-year-old female who presents to the office for follow-up of her left knee pain.  Patient continues to have significant left knee pain.  She has been cleared for surgery.  1/7/2025  Caroilna Whyte is a 75-year-old female who presented to the office for follow-up of her left knee pain.  Patient continues to have left knee pain.  She can still have significant pain.  It did somewhat improved with physical therapy.  The meloxicam did not help.  Pain is worse with sitting to standing.  Pain is located throughout the knee.  She has tried NSAIDs.  No falls.  No fevers or chills.  11/26/2024 Carolina Whyte is a 75-year-old female who presents to the office for evaluation of her left knee pain.  Patient has been experiencing left knee pain for about 1 year.  Pain is located throughout the knee.  Pain is worse with walking.  She has tried Tylenol.  She has not tried physical therapy or injections.  No falls.  No fevers or chills.  History: Diabetes, HTN, HLD

## 2025-04-01 NOTE — PHYSICAL EXAM
[de-identified] : Constitutional:  75 year old female, alert and oriented, cooperative, in no acute distress.  HEENT  NC/AT.  Appearance: symmetric  Neck/Back Straight without deformity or instability.  Good ROM.  Chest/Respiratory  Respiratory effort: no intercostal retractions or use of accessory muscles. Nonlabored Breathing  Skin  On inspection, warm and dry without rashes or lesions.  Mental Status:  Judgment, insight: intact Orientation: oriented to time, place, and person  Neurological: Sensory and Motor are grossly intact throughout  Left Knee  Inspection:     Incision well healed. No erythema or drainage     No Effusion     Non-tender to palpation over tibial tubercle, patella, medial and lateral joint line, and pes insertion.  Range of Motion: 	Extension - -5 degrees 	Flexion - 90 degrees 	Extensor lag: None  Stability:      Demonstrates no Varus or Valgus instability      Negative Anterior or Posterior drawer.      No mid flexion instability  Patella: stable, tracks well.   Neurologic Exam     Motor intact including 5/5 Extensor Hallucis Longus, 5/5 Flexor Hallucis Longus, 5/5 Tibialis Anterior and 5/5 Gastrocnemius     Sensation Intact to Light Touch including Saphenous, Sural, Superficial Peroneal, Deep Peroneal, Tibial nerve distributions  Vascular Exam     Foot is warm and well perfused with 2+ Dorsalis Pedis Pulse   No pain with range of motion of the bilateral hips or right knee. No lumbar paraspinal muscle tenderness. [de-identified] : XRay:  XRays of the Left Knee (3 Views) taken on 2/6/2025. XRays demonstrate a Left Total Knee Arthroplasty in good position and alignment. There is no obvious evidence of fracture, dislocation, osteolysis or loosening. (my personal interpretation) Components: [place] [employer] C[employer]ed

## 2025-04-01 NOTE — DISCUSSION/SUMMARY
[de-identified] : Carolina Whyte is a 75-year-old female who presents to the office for follow-up of her left TKA. XRays showed left total knee arthroplasty in good position and alignment. Examination showed range of motion -5 to 90. Discussed with the patient the examination and imaging findings. Discussed the management of total knee arthroplasty at this time, including Manipulation under anesthesia.  Discussed with the patient that she continues to have decreased knee range of motion.  Discussed the management of knee arthrofibrosis, including manipulation under anesthesia.  Discussed manipulation under anesthesia at length, including procedure, anesthesia, recovery, and physical therapy, and risks.  Discussed that alternative management would consist of continued physical therapy.  Patient would like to proceed with manipulation under anesthesia.  Patient understanding and in agreement with the plan.  All questions answered.  Plan: -Left knee MYRA

## 2025-04-22 NOTE — DISCUSSION/SUMMARY
[de-identified] : Carolina Whyte is a 75 year old female who presented to the office for follow up of her Left TKA. XRays showed left total knee arthroplasty in good position and alignment. Examination showed range of motion -5 to 100. Discussed with the patient the examination and imaging findings. Discussed the management of total knee arthroplasty at this time, including physical therapy. Patient will continue physical therapy. Patient has completed DVT prophylaxis. Patient will follow up with her PCP. Patient will follow up in 4 weeks for reevaluation and management. Patient understanding and in agreement with the plan. All questions answered.     Plan: -Physical Therapy -DVT Prophylaxis: Completed -Follow up with PCP -Follow up in 4 weeks for reevaluation and management

## 2025-04-22 NOTE — HISTORY OF PRESENT ILLNESS
[de-identified] : 4/22/2025  Carolina Whyte presents to the office for follow-up of her left TKA.  Patient is currently doing well overall.  She has been experiencing some nausea and diarrhea.  She is following up with her PCP.  4/1/2025  Carolina Whyte presents to the office for follow-up of her left total knee arthroplasty.  Patient does not have significant knee pain.  She is currently in physical therapy.  She continues to have some decreased left knee range of motion.  3/4/2025  Carolina Whyet presents to the office for follow-up of her left total knee arthroplasty.  Patient is doing well overall.  She is currently in physical therapy.  2/6/2025  Carolina Whyte presents to the office for follow-up of her left total knee arthroplasty.  Patient is currently doing well overall.  She is currently in home physical therapy.  No falls.  No fevers or chills.  1/23/2025  Carolina Whyte is a 75-year-old female who presents to the office for follow-up of her left knee pain.  Patient continues to have significant left knee pain.  She has been cleared for surgery.  1/7/2025  Carolina Whyte is a 75-year-old female who presented to the office for follow-up of her left knee pain.  Patient continues to have left knee pain.  She can still have significant pain.  It did somewhat improved with physical therapy.  The meloxicam did not help.  Pain is worse with sitting to standing.  Pain is located throughout the knee.  She has tried NSAIDs.  No falls.  No fevers or chills.  11/26/2024 Carolina Whyte is a 75-year-old female who presents to the office for evaluation of her left knee pain.  Patient has been experiencing left knee pain for about 1 year.  Pain is located throughout the knee.  Pain is worse with walking.  She has tried Tylenol.  She has not tried physical therapy or injections.  No falls.  No fevers or chills.  History: Diabetes, HTN, HLD

## 2025-04-22 NOTE — DISCUSSION/SUMMARY
[de-identified] : Carolina Whyte is a 75 year old female who presented to the office for follow up of her Left TKA. XRays showed left total knee arthroplasty in good position and alignment. Examination showed range of motion -5 to 100. Discussed with the patient the examination and imaging findings. Discussed the management of total knee arthroplasty at this time, including physical therapy. Patient will continue physical therapy. Patient has completed DVT prophylaxis. Patient will follow up with her PCP. Patient will follow up in 4 weeks for reevaluation and management. Patient understanding and in agreement with the plan. All questions answered.     Plan: -Physical Therapy -DVT Prophylaxis: Completed -Follow up with PCP -Follow up in 4 weeks for reevaluation and management

## 2025-04-22 NOTE — PHYSICAL EXAM
[de-identified] : Constitutional:  75 year old female, alert and oriented, cooperative, in no acute distress.  HEENT  NC/AT.  Appearance: symmetric  Neck/Back Straight without deformity or instability.  Good ROM.  Chest/Respiratory  Respiratory effort: no intercostal retractions or use of accessory muscles. Nonlabored Breathing  Skin  On inspection, warm and dry without rashes or lesions.  Mental Status:  Judgment, insight: intact Orientation: oriented to time, place, and person  Neurological: Sensory and Motor are grossly intact throughout  Left Knee  Inspection:     Incision well healed. No erythema or drainage     No Effusion     Non-tender to palpation over tibial tubercle, patella, medial and lateral joint line, and pes insertion.  Range of Motion: 	Extension - -5 degrees 	Flexion - 100 degrees 	Extensor lag: None  Stability:      Demonstrates no Varus or Valgus instability      Negative Anterior or Posterior drawer.      No mid flexion instability  Patella: stable, tracks well.   Neurologic Exam     Motor intact including 5/5 Extensor Hallucis Longus, 5/5 Flexor Hallucis Longus, 5/5 Tibialis Anterior and 5/5 Gastrocnemius     Sensation Intact to Light Touch including Saphenous, Sural, Superficial Peroneal, Deep Peroneal, Tibial nerve distributions  Vascular Exam     Foot is warm and well perfused with 2+ Dorsalis Pedis Pulse   No pain with range of motion of the bilateral hips or right knee. No lumbar paraspinal muscle tenderness. [de-identified] : XRay:  XRays of the Left Knee (3 Views) taken on 2/6/2025. XRays demonstrate a Left Total Knee Arthroplasty in good position and alignment. There is no obvious evidence of fracture, dislocation, osteolysis or loosening. (my personal interpretation) Components: [place] [employer] C[employer]ed

## 2025-04-22 NOTE — REVIEW OF SYSTEMS
VN note: VN cued into patient's room for PICC Line time out. Dressing secured and labeled by PICC line nurse. VN released X-ray order to verify placement.    [Joint Pain] : joint pain [Negative] : Endocrine [Joint Stiffness] : no joint stiffness [Joint Swelling] : no joint swelling [Fever] : no fever [Chills] : no chills

## 2025-04-22 NOTE — HISTORY OF PRESENT ILLNESS
[de-identified] : 4/22/2025  Carolina Whyte presents to the office for follow-up of her left TKA.  Patient is currently doing well overall.  She has been experiencing some nausea and diarrhea.  She is following up with her PCP.  4/1/2025  Carolina Whyte presents to the office for follow-up of her left total knee arthroplasty.  Patient does not have significant knee pain.  She is currently in physical therapy.  She continues to have some decreased left knee range of motion.  3/4/2025  Carolina Whyte presents to the office for follow-up of her left total knee arthroplasty.  Patient is doing well overall.  She is currently in physical therapy.  2/6/2025  Carolina Whyte presents to the office for follow-up of her left total knee arthroplasty.  Patient is currently doing well overall.  She is currently in home physical therapy.  No falls.  No fevers or chills.  1/23/2025  Carolina Whyte is a 75-year-old female who presents to the office for follow-up of her left knee pain.  Patient continues to have significant left knee pain.  She has been cleared for surgery.  1/7/2025  Carolina Whyte is a 75-year-old female who presented to the office for follow-up of her left knee pain.  Patient continues to have left knee pain.  She can still have significant pain.  It did somewhat improved with physical therapy.  The meloxicam did not help.  Pain is worse with sitting to standing.  Pain is located throughout the knee.  She has tried NSAIDs.  No falls.  No fevers or chills.  11/26/2024 Carolina Whyte is a 75-year-old female who presents to the office for evaluation of her left knee pain.  Patient has been experiencing left knee pain for about 1 year.  Pain is located throughout the knee.  Pain is worse with walking.  She has tried Tylenol.  She has not tried physical therapy or injections.  No falls.  No fevers or chills.  History: Diabetes, HTN, HLD

## 2025-04-22 NOTE — PHYSICAL EXAM
[de-identified] : Constitutional:  75 year old female, alert and oriented, cooperative, in no acute distress.  HEENT  NC/AT.  Appearance: symmetric  Neck/Back Straight without deformity or instability.  Good ROM.  Chest/Respiratory  Respiratory effort: no intercostal retractions or use of accessory muscles. Nonlabored Breathing  Skin  On inspection, warm and dry without rashes or lesions.  Mental Status:  Judgment, insight: intact Orientation: oriented to time, place, and person  Neurological: Sensory and Motor are grossly intact throughout  Left Knee  Inspection:     Incision well healed. No erythema or drainage     No Effusion     Non-tender to palpation over tibial tubercle, patella, medial and lateral joint line, and pes insertion.  Range of Motion: 	Extension - -5 degrees 	Flexion - 100 degrees 	Extensor lag: None  Stability:      Demonstrates no Varus or Valgus instability      Negative Anterior or Posterior drawer.      No mid flexion instability  Patella: stable, tracks well.   Neurologic Exam     Motor intact including 5/5 Extensor Hallucis Longus, 5/5 Flexor Hallucis Longus, 5/5 Tibialis Anterior and 5/5 Gastrocnemius     Sensation Intact to Light Touch including Saphenous, Sural, Superficial Peroneal, Deep Peroneal, Tibial nerve distributions  Vascular Exam     Foot is warm and well perfused with 2+ Dorsalis Pedis Pulse   No pain with range of motion of the bilateral hips or right knee. No lumbar paraspinal muscle tenderness. [de-identified] : XRay:  XRays of the Left Knee (3 Views) taken on 2/6/2025. XRays demonstrate a Left Total Knee Arthroplasty in good position and alignment. There is no obvious evidence of fracture, dislocation, osteolysis or loosening. 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

## 2025-06-03 NOTE — HISTORY OF PRESENT ILLNESS
[de-identified] : 6/3/2025  Carolina Whyte presents to the office for follow-up of her left knee MYRA.  Patient went to PT once following MYRA.  She has not been back to PT.  She has been experiencing some dizziness and vertigo symptoms.  Patient is following up with ENT.  4/22/2025  Carolina Whyte presents to the office for follow-up of her left TKA.  Patient is currently doing well overall.  She has been experiencing some nausea and diarrhea.  She is following up with her PCP.  4/1/2025  Carolina Whyte presents to the office for follow-up of her left total knee arthroplasty.  Patient does not have significant knee pain.  She is currently in physical therapy.  She continues to have some decreased left knee range of motion.  3/4/2025  Carolina Whyte presents to the office for follow-up of her left total knee arthroplasty.  Patient is doing well overall.  She is currently in physical therapy.  2/6/2025  Carolina Whyte presents to the office for follow-up of her left total knee arthroplasty.  Patient is currently doing well overall.  She is currently in home physical therapy.  No falls.  No fevers or chills.  1/23/2025  Carolina Whyte is a 75-year-old female who presents to the office for follow-up of her left knee pain.  Patient continues to have significant left knee pain.  She has been cleared for surgery.  1/7/2025  Carolina Whyte is a 75-year-old female who presented to the office for follow-up of her left knee pain.  Patient continues to have left knee pain.  She can still have significant pain.  It did somewhat improved with physical therapy.  The meloxicam did not help.  Pain is worse with sitting to standing.  Pain is located throughout the knee.  She has tried NSAIDs.  No falls.  No fevers or chills.  11/26/2024 Carolina Whyte is a 75-year-old female who presents to the office for evaluation of her left knee pain.  Patient has been experiencing left knee pain for about 1 year.  Pain is located throughout the knee.  Pain is worse with walking.  She has tried Tylenol.  She has not tried physical therapy or injections.  No falls.  No fevers or chills.  History: Diabetes, HTN, HLD

## 2025-06-03 NOTE — PHYSICAL EXAM
[de-identified] : Constitutional:  75 year old female, alert and oriented, cooperative, in no acute distress.  HEENT  NC/AT.  Appearance: symmetric  Neck/Back Straight without deformity or instability.  Good ROM.  Chest/Respiratory  Respiratory effort: no intercostal retractions or use of accessory muscles. Nonlabored Breathing  Skin  On inspection, warm and dry without rashes or lesions.  Mental Status:  Judgment, insight: intact Orientation: oriented to time, place, and person  Neurological: Sensory and Motor are grossly intact throughout  Left Knee  Inspection:     Incision well healed. No erythema or drainage     No Effusion     Non-tender to palpation over tibial tubercle, patella, medial and lateral joint line, and pes insertion.  Range of Motion: 	Extension - -5 degrees 	Flexion - 90 degrees 	Extensor lag: None  Stability:      Demonstrates no Varus or Valgus instability      Negative Anterior or Posterior drawer.      No mid flexion instability  Patella: stable, tracks well.   Neurologic Exam     Motor intact including 5/5 Extensor Hallucis Longus, 5/5 Flexor Hallucis Longus, 5/5 Tibialis Anterior and 5/5 Gastrocnemius     Sensation Intact to Light Touch including Saphenous, Sural, Superficial Peroneal, Deep Peroneal, Tibial nerve distributions  Vascular Exam     Foot is warm and well perfused with 2+ Dorsalis Pedis Pulse   No pain with range of motion of the bilateral hips or right knee. No lumbar paraspinal muscle tenderness. [de-identified] : XRay:  XRays of the Left Knee (3 Views) taken on 2/6/2025. XRays demonstrate a Left Total Knee Arthroplasty in good position and alignment. There is no obvious evidence of fracture, dislocation, osteolysis or loosening. (my personal interpretation) Components: [place] [employer] C[employer]

## 2025-06-03 NOTE — DISCUSSION/SUMMARY
[de-identified] : Carolina Whyte is a 75 year old female who presented to the office for follow up of her Left TKA. XRays showed left total knee arthroplasty in good position and alignment. Examination showed range of motion -5 to 90 degrees. Discussed with the patient the examination and imaging findings.  Discussed with the patient the management of her knee at this time, including physical therapy.  Patient will continue/restart her physical therapy.  She has not been to much PT since her MYRA.  Patient has been experiencing dizziness/vertigo symptoms.  She is planned to follow-up with ENT.  Patient will follow-up in 6 weeks for reevaluation and management.  Patient understanding and in agreement the plan.  All questions answered.  Plan: - Follow-up with ENT - Physical therapy - Follow-up in 6 weeks for reevaluation and management

## 2025-06-03 NOTE — DISCUSSION/SUMMARY
[de-identified] : Carolina Whyte is a 75 year old female who presented to the office for follow up of her Left TKA. XRays showed left total knee arthroplasty in good position and alignment. Examination showed range of motion -5 to 90 degrees. Discussed with the patient the examination and imaging findings.  Discussed with the patient the management of her knee at this time, including physical therapy.  Patient will continue/restart her physical therapy.  She has not been to much PT since her MYRA.  Patient has been experiencing dizziness/vertigo symptoms.  She is planned to follow-up with ENT.  Patient will follow-up in 6 weeks for reevaluation and management.  Patient understanding and in agreement the plan.  All questions answered.  Plan: - Follow-up with ENT - Physical therapy - Follow-up in 6 weeks for reevaluation and management

## 2025-06-03 NOTE — PHYSICAL EXAM
[de-identified] : Constitutional:  75 year old female, alert and oriented, cooperative, in no acute distress.  HEENT  NC/AT.  Appearance: symmetric  Neck/Back Straight without deformity or instability.  Good ROM.  Chest/Respiratory  Respiratory effort: no intercostal retractions or use of accessory muscles. Nonlabored Breathing  Skin  On inspection, warm and dry without rashes or lesions.  Mental Status:  Judgment, insight: intact Orientation: oriented to time, place, and person  Neurological: Sensory and Motor are grossly intact throughout  Left Knee  Inspection:     Incision well healed. No erythema or drainage     No Effusion     Non-tender to palpation over tibial tubercle, patella, medial and lateral joint line, and pes insertion.  Range of Motion: 	Extension - -5 degrees 	Flexion - 90 degrees 	Extensor lag: None  Stability:      Demonstrates no Varus or Valgus instability      Negative Anterior or Posterior drawer.      No mid flexion instability  Patella: stable, tracks well.   Neurologic Exam     Motor intact including 5/5 Extensor Hallucis Longus, 5/5 Flexor Hallucis Longus, 5/5 Tibialis Anterior and 5/5 Gastrocnemius     Sensation Intact to Light Touch including Saphenous, Sural, Superficial Peroneal, Deep Peroneal, Tibial nerve distributions  Vascular Exam     Foot is warm and well perfused with 2+ Dorsalis Pedis Pulse   No pain with range of motion of the bilateral hips or right knee. No lumbar paraspinal muscle tenderness. [de-identified] : XRay:  XRays of the Left Knee (3 Views) taken on 2/6/2025. XRays demonstrate a Left Total Knee Arthroplasty in good position and alignment. There is no obvious evidence of fracture, dislocation, osteolysis or loosening. (my personal interpretation) Components: [employer]

## 2025-06-03 NOTE — HISTORY OF PRESENT ILLNESS
[de-identified] : 6/3/2025  Carolina Whyte presents to the office for follow-up of her left knee MYRA.  Patient went to PT once following MYRA.  She has not been back to PT.  She has been experiencing some dizziness and vertigo symptoms.  Patient is following up with ENT.  4/22/2025  Carolina Whyte presents to the office for follow-up of her left TKA.  Patient is currently doing well overall.  She has been experiencing some nausea and diarrhea.  She is following up with her PCP.  4/1/2025  Carolina Whyte presents to the office for follow-up of her left total knee arthroplasty.  Patient does not have significant knee pain.  She is currently in physical therapy.  She continues to have some decreased left knee range of motion.  3/4/2025  Carolina Whyte presents to the office for follow-up of her left total knee arthroplasty.  Patient is doing well overall.  She is currently in physical therapy.  2/6/2025  Carolina Whyte presents to the office for follow-up of her left total knee arthroplasty.  Patient is currently doing well overall.  She is currently in home physical therapy.  No falls.  No fevers or chills.  1/23/2025  Carolina Whyte is a 75-year-old female who presents to the office for follow-up of her left knee pain.  Patient continues to have significant left knee pain.  She has been cleared for surgery.  1/7/2025  Carolina Whyte is a 75-year-old female who presented to the office for follow-up of her left knee pain.  Patient continues to have left knee pain.  She can still have significant pain.  It did somewhat improved with physical therapy.  The meloxicam did not help.  Pain is worse with sitting to standing.  Pain is located throughout the knee.  She has tried NSAIDs.  No falls.  No fevers or chills.  11/26/2024 Carolina Whyte is a 75-year-old female who presents to the office for evaluation of her left knee pain.  Patient has been experiencing left knee pain for about 1 year.  Pain is located throughout the knee.  Pain is worse with walking.  She has tried Tylenol.  She has not tried physical therapy or injections.  No falls.  No fevers or chills.  History: Diabetes, HTN, HLD